# Patient Record
Sex: MALE | Race: WHITE | HISPANIC OR LATINO | Employment: FULL TIME | URBAN - METROPOLITAN AREA
[De-identification: names, ages, dates, MRNs, and addresses within clinical notes are randomized per-mention and may not be internally consistent; named-entity substitution may affect disease eponyms.]

---

## 2019-05-11 ENCOUNTER — OFFICE VISIT (OUTPATIENT)
Dept: URGENT CARE | Facility: CLINIC | Age: 24
End: 2019-05-11

## 2019-05-11 VITALS
SYSTOLIC BLOOD PRESSURE: 106 MMHG | OXYGEN SATURATION: 99 % | RESPIRATION RATE: 18 BRPM | TEMPERATURE: 99 F | HEIGHT: 67 IN | HEART RATE: 60 BPM | DIASTOLIC BLOOD PRESSURE: 61 MMHG | WEIGHT: 175 LBS | BODY MASS INDEX: 27.47 KG/M2

## 2019-05-11 DIAGNOSIS — T78.40XA ALLERGIC REACTION, INITIAL ENCOUNTER: Primary | ICD-10-CM

## 2019-05-11 PROCEDURE — 99203 OFFICE O/P NEW LOW 30 MIN: CPT | Mod: S$GLB,,, | Performed by: PHYSICIAN ASSISTANT

## 2019-05-11 PROCEDURE — 99203 PR OFFICE/OUTPT VISIT, NEW, LEVL III, 30-44 MIN: ICD-10-PCS | Mod: S$GLB,,, | Performed by: PHYSICIAN ASSISTANT

## 2019-05-11 RX ORDER — METHYLPREDNISOLONE 4 MG/1
TABLET ORAL
Qty: 1 PACKAGE | Refills: 0 | Status: SHIPPED | OUTPATIENT
Start: 2019-05-11 | End: 2019-06-01

## 2019-05-11 NOTE — PATIENT INSTRUCTIONS
PLEASE READ YOUR DISCHARGE INSTRUCTIONS ENTIRELY AS IT CONTAINS IMPORTANT INFORMATION.  Benadryl as needed for itching.   - Rest.    - Drink plenty of fluids.    - Tylenol or Ibuprofen as directed as needed for fever/pain.    - Follow up with your PCP or specialty clinic as directed in the next 1-2 weeks if not improved or as needed.  You can call [phone] to schedule an appointment with the appropriate provider.    - If you were prescribed antibiotics, please take them to completion.  - If you were prescribed a narcotic medication, do not drive or operate heavy equipment or machinery while taking these medications.  - If you  smoke, please stop smoking.  -You must understand that you've received an Urgent Care treatment only and that you may be released before all your medical problems are known or treated. You, the patient, will    arrange for follow up care as instructed.  - Please return to Urgent Care or to the Emergency Department if your symptoms worsen.    Patient aware and verbalized understanding.    Reacción alérgica [Allergic Reaction, Other (Local)]  Usted está teniendo ezra reacción alérgica. Jo cualquier cosa puede producirla. Distintas personas son alérgicas a distintas cosas. Por lo general es algo que comió o tragó, o con lo que entró en contacto al ponerlo o prenderlo a dias piel o a dias ropa, o incluso algo que inhaló en el aire. [place] puede ser muy molesto y a veces asustador.  Síntomas  La mayoría de nosotros pensamos en ezra reacción alérgica cuando tenemos un salpullido o picazón en la piel. Los síntomas pueden incluir:  · Salpullido, ronchas, enrojecimiento, granos, ampollas  · Picazón, ardor, punzadas, dolor  · Piel seca, escamosa o agrietada  · Hinchazón de la thania, los labios u otras partes del cuerpo  Causas  Algunas veces la causa puede ser totalmente obvia. Sin embargo, existen tantas cosas que pueden provocar ezra reacción que muchas veces no se puede determinar la causa. Lo más  importante para tener en cuenta es:  · Recordar cuándo empezó  · Lo que estaba haciendo en thea momento o sue antes  · Las actividades en las que estaba involucrado  · Cualquier producto o contacto nuevo  Estas son algunas de las causas más comunes, ramy recuerde que khanh cualquier cosa puede provocar ezra reacción, y usted puede ni darse cuenta de que entró en contacto con ezra de estas cosas.  · Polvo, moho, polen  · Plantas, hierbas, zumaque o hiedra venenosa (son comunes, ramy hay otras)  · Animales  · Alimentos kamran langostinos, mariscos, cacahuate (maní), productos lácteos, gluten, huevos, colorantes y aditivos de sabor  · Picaduras de insectos kamran mosquitos, pulgas o piojos  · Medicamentos kamran penicilina, sulfas, amoxicilina, aspirina, ibuprofeno. Cualquier medicamento puede provocar ezra reacción  · Joyas o alhajas (níquel, mi) pueden ser nuevas o haberlas ya usado por algún tiempo. También estos metales en cremalleras y botones.  · Látex, puede ser en guantes, prendas de vestir, juguetes, globos o algunas cintas. Algunas personas alérgicas al látex pueden tener problemas con alimentos kamran plátanos (bananas), aguacates, kiwis, papayas o castañas.  Cuidados en el hogar  [place] puede darse cuenta, desafortunadamente hay muchas causas para reacciones alérgicas y podemos no ser conscientes de la causa. No existe ezra prueba que podamos hacernos, y la mejor manera en que se pueden detectar es observando detenidamente lo que pasó y monitoreando los efectos que estas cosas puedan tener en el futuro.  El objetivo de nuestro tratamiento es ayudarle a aliviar los síntomas para que se sienta mejor. El salpullido suele ir desapareciendo en el transcurso de varios días, ramy algunas veces puede durar hasta dos semanas. [place] los próximos dos días, puede curtis momentos en que empeora un poco y luego vuelve a mejorar. Estas son algunas cosas que puede hacer:  · Si ya sabe a lo que es alérgico, evítelo ya que las  reacciones futuras pueden ser peor que esta.  · Evite la ropa apretada y cualquier cosa que caliente dias piel (las duchas o agustina calientes, los alice de sol directos) puesto que el calor puede empeorar la picazón.  · Ezra bolsa de hielo puede aliviar las zonas de picazón intensa y el enrojecimiento. No ponga el hielo directamente sobre la piel porque puede causarle daño. Póngalo en ezra bolsa de plástico o envuélvalo en ezra toalla, ezra camiseta o un paño limpio.  · El Benadryl (difenhiframina) oral es un antiestamínico que se consigue en farmacias y supermercados. A no ser que le hayan dado ezra receta para un antiestamínico, el Benadryl puede usarse para reducir la picazón de amplias zonas de la piel que estén involucradas. Puede ponerlo somnoliento, de manera que tenga cuidado cuando lo use markie el día o al ir a la escuela, trabajar o manejar. [NOTA: no use Benadryl si tiene glaucoma o si es un hombre con dificultad para orinar debido a ezra próstata agrandada.] Hay antiestamínicos que producen menos somnolencia y que son buenas alternativas markie el día. [place] sugerencias a dias farmacéutico.  · No use Benadryl en crema en dias piel, ya que en algunas personas puede aumentar la reacción y volverlo alérgico al Benadryl.  · Trate de no rascarse. [place] puede agrietar la piel y provocar ezra infección.  Si sabe qué fue lo que disparó la reacción alérgica trate de evitar o limitar dias exposición a esto. Utilizar vapor caliente para limpiar dias casa, utilizar aspiradoras y filtros (HEPA) de ifeoma eficiencia contra partículas, evitar los desencadenantes en alimentos y animales, exterminar las cucarachas y limpiar la casa con frecuencia son algunas de las estrategias que puede usar para disminuir garrick reacciones alérgicas.  Cuidados de seguimiento  Iris un seguimiento con dias proveedor de atención médica en los próximos dos días si garrick síntomas no continúan mejorando o si empeoran.  Cuándo buscar consejo médico  Llame de  inmediato a dias proveedor de atención médica si algo de lo siguiente ocurre:  · Aumentan las zonas en las que hay picazón, enrojecimiento o hinchazón  · Nueva o peor hinchazón en la thania, los párpados o los labios  · Mareos, debilidad  · Señales de infección  ¨ Aumenta el enrojecimiento  ¨ Aumenta el dolor o la hinchazón  ¨ Fiebre (1 nay por encima de la temperatura normal) que dura de 24 a 48 horas, o lo que le haya dicho el proveedor de atención médica de acuerdo con dias afección  ¨ Líquido de color que supura de las zonas inflamadas  Llame al 911  Llame al 911 si algo de lo siguiente ocurre:  · Dificultad para respirar o tragar, silbidos al respirar  · Nueva hinchazón o que empeora en la boca, la garganta y la lengua  · Voz ronca o dificultad para hablar  · Confusión  · Mucha somnolencia o dificultad para despertarse  · Desmayo o pérdida del conocimiento  · Latidos del corazón acelerados  · Presión arterial baja  · Sensación de catástrofe inminente  · Náuseas, vómito, dolor abdominal, diarrea  · Vomita dilcia, o grandes cantidades de dilcia en las heces  · Convulsiones  Date Last Reviewed: 7/30/2015 © 2000-2017 [employer]. [address] [place] [address], [place] [address]. Todos los derechos reservados. Esta información no pretende sustituir la atención médica profesional. Sólo dias médico puede diagnosticar y tratar un problema de sukumar.

## 2019-05-11 NOTE — PROGRESS NOTES
"Subjective:       Patient ID: Theron Conner is a 23 y.o. male.    Vitals:  height is 5' 7" (1.702 m) and weight is 79.4 kg (175 lb). His temperature is 98.6 °F (37 °C). His blood pressure is 106/61 and his pulse is 60. His respiration is 18 and oxygen saturation is 99%.     Chief Complaint: Rash    Mr. Conner presents today for evaluation of rash.  He has had the rash for the past 5 days.  He has raised wheals on multiple areas of the body including upper back, abdomen, thighs bilaterally, forearms & penis.  The rash is itchy.  He has tried hydrocortisone cream with no relief. He denies any contacts with a similar rash.  No penile discharge or STD exposure.  No exposure to new foods, detergents.    Rash   This is a new problem. Episode onset: 1 week. The problem has been gradually worsening since onset. The affected locations include the right upper leg, left upper leg, back and abdomen (on penis area bump). The rash is characterized by itchiness. He was exposed to nothing. Pertinent negatives include no cough, fever or sore throat. Past treatments include anti-itch cream. The treatment provided no relief. There is no history of eczema.       Constitution: Negative for chills and fever.   HENT: Negative for facial swelling and sore throat.    Neck: Negative for painful lymph nodes.   Eyes: Negative for eye itching and eyelid swelling.   Respiratory: Negative for cough.    Musculoskeletal: Negative for joint pain and joint swelling.   Skin: Positive for rash and hives. Negative for color change, pale, wound, abrasion, laceration, lesion, skin thickening/induration, puncture wound, erythema, abscess and avulsion.   Allergic/Immunologic: Positive for hives. Negative for environmental allergies and immunocompromised state.   Hematologic/Lymphatic: Negative for swollen lymph nodes.       Objective:      Physical Exam   Constitutional: He is oriented to person, place, and time. He appears well-developed and " well-nourished. He is cooperative.  Non-toxic appearance. He does not appear ill. No distress.   HENT:   Head: Normocephalic and atraumatic.   Right Ear: Hearing, tympanic membrane, external ear and ear canal normal.   Left Ear: Hearing, tympanic membrane, external ear and ear canal normal.   Nose: Nose normal. No mucosal edema, rhinorrhea or nasal deformity. No epistaxis. Right sinus exhibits no maxillary sinus tenderness and no frontal sinus tenderness. Left sinus exhibits no maxillary sinus tenderness and no frontal sinus tenderness.   Mouth/Throat: Uvula is midline, oropharynx is clear and moist and mucous membranes are normal. No trismus in the jaw. Normal dentition. No uvula swelling. No posterior oropharyngeal erythema.   Eyes: Conjunctivae and lids are normal. Right eye exhibits no discharge. Left eye exhibits no discharge. No scleral icterus.   Sclera clear bilat   Neck: Trachea normal, normal range of motion, full passive range of motion without pain and phonation normal. Neck supple.   Cardiovascular: Normal rate, regular rhythm, normal heart sounds, intact distal pulses and normal pulses.   Pulmonary/Chest: Effort normal and breath sounds normal. No respiratory distress.   Abdominal: Soft. Normal appearance and bowel sounds are normal. He exhibits no distension, no pulsatile midline mass and no mass. There is no tenderness.   Genitourinary:         Musculoskeletal: Normal range of motion. He exhibits no edema or deformity.   Neurological: He is alert and oriented to person, place, and time. He exhibits normal muscle tone. Coordination normal.   Skin: Skin is warm, dry and intact. Rash noted. Rash is nodular and urticarial. Rash is not macular, not pustular and not vesicular. He is not diaphoretic. No erythema. No pallor.        Psychiatric: He has a normal mood and affect. His speech is normal and behavior is normal. Judgment and thought content normal. Cognition and memory are normal.   Nursing note and  vitals reviewed.      Assessment:       1. Allergic reaction, initial encounter        Plan:         Allergic reaction, initial encounter    Other orders  -     methylPREDNISolone (MEDROL DOSEPACK) 4 mg tablet; use as directed  Dispense: 1 Package; Refill: 0      Patient Instructions   PLEASE READ YOUR DISCHARGE INSTRUCTIONS ENTIRELY AS IT CONTAINS IMPORTANT INFORMATION.  Benadryl as needed for itching.   - Rest.    - Drink plenty of fluids.    - Tylenol or Ibuprofen as directed as needed for fever/pain.    - Follow up with your PCP or specialty clinic as directed in the next 1-2 weeks if not improved or as needed.  You can call [phone] to schedule an appointment with the appropriate provider.    - If you were prescribed antibiotics, please take them to completion.  - If you were prescribed a narcotic medication, do not drive or operate heavy equipment or machinery while taking these medications.  - If you  smoke, please stop smoking.  -You must understand that you've received an Urgent Care treatment only and that you may be released before all your medical problems are known or treated. You, the patient, will    arrange for follow up care as instructed.  - Please return to Urgent Care or to the Emergency Department if your symptoms worsen.    Patient aware and verbalized understanding.    Reacción alérgica [Allergic Reaction, Other (Local)]  Usted está teniendo ezra reacción alérgica. [place] cualquier cosa puede producirla. Distintas personas son alérgicas a distintas cosas. Por lo general es algo que comió o tragó, o con lo que entró en contacto al ponerlo o prenderlo a dias piel o a dias ropa, o incluso algo que inhaló en el aire. [place] puede ser muy molesto y a veces asustador.  Síntomas  La mayoría de nosotros pensamos en ezra reacción alérgica cuando tenemos un salpullido o picazón en la piel. Los síntomas pueden incluir:  · Salpullido, ronchas, enrojecimiento, granos, ampollas  · Picazón, ardor, punzadas,  dolor  · Piel seca, escamosa o agrietada  · Hinchazón de la thania, los labios u otras partes del cuerpo  Causas  Algunas veces la causa puede ser totalmente obvia. Sin embargo, existen tantas cosas que pueden provocar ezra reacción que muchas veces no se puede determinar la causa. Lo más importante para tener en cuenta es:  · Recordar cuándo empezó  · Lo que estaba haciendo en thea momento o sue antes  · Las actividades en las que estaba involucrado  · Cualquier producto o contacto nuevo  Estas son algunas de las causas más comunes, ramy recuerde que khanh cualquier cosa puede provocar ezra reacción, y usted puede ni darse cuenta de que entró en contacto con ezra de estas cosas.  · Polvo, moho, polen  · Plantas, hierbas, zumaque o hiedra venenosa (son comunes, ramy hay otras)  · Animales  · Alimentos kamran langostinos, mariscos, cacahuate (maní), productos lácteos, gluten, huevos, colorantes y aditivos de sabor  · Picaduras de insectos kamran mosquitos, pulgas o piojos  · Medicamentos kamran penicilina, sulfas, amoxicilina, aspirina, ibuprofeno. Cualquier medicamento puede provocar ezra reacción  · Joyas o alhajas (níquel, mi) pueden ser nuevas o haberlas ya usado por algún tiempo. También estos metales en cremalleras y botones.  · Látex, puede ser en guantes, prendas de vestir, juguetes, globos o algunas cintas. Algunas personas alérgicas al látex pueden tener problemas con alimentos kamran plátanos (bananas), aguacates, kiwis, papayas o castañas.  Cuidados en el hogar  [place] puede darse cuenta, desafortunadamente hay muchas causas para reacciones alérgicas y podemos no ser conscientes de la causa. No existe ezra prueba que podamos hacernos, y la mejor manera en que se pueden detectar es observando detenidamente lo que pasó y monitoreando los efectos que estas cosas puedan tener en el futuro.  El objetivo de nuestro tratamiento es ayudarle a aliviar los síntomas para que se sienta mejor. El salpullido suele ir desapareciendo en  el transcurso de varios días, ramy algunas veces puede durar hasta dos semanas. [place] los próximos dos días, puede curtis momentos en que empeora un poco y luego vuelve a mejorar. Estas son algunas cosas que puede hacer:  · Si ya sabe a lo que es alérgico, evítelo ya que las reacciones futuras pueden ser peor que esta.  · Evite la ropa apretada y cualquier cosa que caliente dias piel (las duchas o agustina calientes, los alice de sol directos) puesto que el calor puede empeorar la picazón.  · Ezra bolsa de hielo puede aliviar las zonas de picazón intensa y el enrojecimiento. No ponga el hielo directamente sobre la piel porque puede causarle daño. Póngalo en ezra bolsa de plástico o envuélvalo en ezra toalla, ezra camiseta o un paño limpio.  · El Benadryl (difenhiframina) oral es un antiestamínico que se consigue en farmacias y supermercados. A no ser que le hayan dado ezra receta para un antiestamínico, el Benadryl puede usarse para reducir la picazón de amplias zonas de la piel que estén involucradas. Puede ponerlo somnoliento, de manera que tenga cuidado cuando lo use markie el día o al ir a la escuela, trabajar o manejar. [NOTA: no use Benadryl si tiene glaucoma o si es un hombre con dificultad para orinar debido a ezra próstata agrandada.] Hay antiestamínicos que producen menos somnolencia y que son buenas alternativas markie el día. [place] sugerencias a dias farmacéutico.  · No use Benadryl en crema en dias piel, ya que en algunas personas puede aumentar la reacción y volverlo alérgico al Benadryl.  · Trate de no rascarse. [place] puede agrietar la piel y provocar ezra infección.  Si sabe qué fue lo que disparó la reacción alérgica trate de evitar o limitar dias exposición a esto. Utilizar vapor caliente para limpiar dias casa, utilizar aspiradoras y filtros (HEPA) de ifeoma eficiencia contra partículas, evitar los desencadenantes en alimentos y animales, exterminar las cucarachas y limpiar la casa con frecuencia son algunas de las  estrategias que puede usar para disminuir garrick reacciones alérgicas.  Cuidados de seguimiento  [place] un seguimiento con dias proveedor de atención médica en los próximos dos días si garrick síntomas no continúan mejorando o si empeoran.  Cuándo buscar consejo médico  Llame de inmediato a dias proveedor de atención médica si algo de lo siguiente ocurre:  · Aumentan las zonas en las que hay picazón, enrojecimiento o hinchazón  · Nueva o peor hinchazón en la thania, los párpados o los labios  · Mareos, debilidad  · Señales de infección  ¨ Aumenta el enrojecimiento  ¨ Aumenta el dolor o la hinchazón  ¨ Fiebre (1 nay por encima de la temperatura normal) que dura de 24 a 48 horas, o lo que le haya dicho el proveedor de atención médica de acuerdo con dias afección  ¨ Líquido de color que supura de las zonas inflamadas  Llame al 911  Llame al 911 si algo de lo siguiente ocurre:  · Dificultad para respirar o tragar, silbidos al respirar  · Nueva hinchazón o que empeora en la boca, la garganta y la lengua  · Voz ronca o dificultad para hablar  · Confusión  · Mucha somnolencia o dificultad para despertarse  · Desmayo o pérdida del conocimiento  · Latidos del corazón acelerados  · Presión arterial baja  · Sensación de catástrofe inminente  · Náuseas, vómito, dolor abdominal, diarrea  · Vomita dilcia, o grandes cantidades de dilcia en las heces  · Convulsiones  Date Last Reviewed: 7/30/2015  © 8530-4495 [employer]. [address] [place] [address], [place] [address]. Todos los derechos reservados. Esta información no pretende sustituir la atención médica profesional. Sólo dias médico puede diagnosticar y tratar un problema de sukumar.

## 2019-05-11 NOTE — LETTER
May 11, 2019      [facility]  [place] [address]  Phone: [phone]  Fax: [phone]       Patient: Theron Conner   YOB: 1995  Date of Visit: 05/11/2019    To Whom It May Concern:    Theron Conner  was at [facility] on 05/11/2019. He may return to work on 5/13/19 with no restrictions. If you have any questions or concerns, or if I can be of further assistance, please do not hesitate to contact me.    Sincerely,    Winnie Arboleda PA-C

## 2019-06-09 ENCOUNTER — HOSPITAL ENCOUNTER (EMERGENCY)
Facility: HOSPITAL | Age: 24
Discharge: HOME | End: 2019-06-09
Attending: EMERGENCY MEDICINE

## 2019-06-09 VITALS
HEIGHT: 67 IN | OXYGEN SATURATION: 98 % | SYSTOLIC BLOOD PRESSURE: 127 MMHG | RESPIRATION RATE: 16 BRPM | WEIGHT: 175 LBS | DIASTOLIC BLOOD PRESSURE: 59 MMHG | TEMPERATURE: 98 F | HEART RATE: 71 BPM | BODY MASS INDEX: 27.47 KG/M2
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
## 2019-06-09 PROCEDURE — 99283 EMERGENCY DEPT VISIT LOW MDM: CPT

## 2019-06-09 RX ORDER — DIPHENHYDRAMINE HCL 50 MG
50 CAPSULE ORAL EVERY 6 HOURS PRN
Qty: 20 CAPSULE | Refills: 0 | Status: SHIPPED | OUTPATIENT
Start: 2019-06-09

## 2019-06-09 RX ORDER — DIPHENHYDRAMINE HCL 50 MG
50 CAPSULE ORAL EVERY 6 HOURS PRN
Qty: 20 CAPSULE | Refills: 0 | COMMUNITY
Start: 2019-06-09 | End: 2019-06-09 | Stop reason: SDUPTHER

## 2019-06-09 NOTE — ED NOTES
Pt complaining of bug bites to gentle area for 1 month after moving a mattress from another room into his. Bites are red, raised and itchy. Pt was prescribed steroids and it has not worked

## 2019-06-09 NOTE — DISCHARGE INSTRUCTIONS
Use benadryl cream as needed for itching  Use hydrocortisone or any other steroid cream as needed for itching

## 2019-06-09 NOTE — ED PROVIDER NOTES
Encounter Date: 6/9/2019    SCRIBE #1 NOTE: I, Mary Rodriguez, am scribing for, and in the presence of,  Dr. Briceno. I have scribed the entire note.       History     Chief Complaint   Patient presents with    Male  Problem     c/o bumps to groin and between fingers x1 month. Went to [facility] about 1 month ago and was told they were hives, but denies improvement with medications     Time seen by provider: 3:34 PM    This is a 23 y.o. male who presents with complaint of bumps on his genitals, that first appeared over a month ago, and have been getting worse. He went to Urgent Care about a month ago, where the bumps were diagnosed as hives. The Urgent Care gave him a medrol dose pack which did not alleviate the symptoms. He has tried cortisone cream, which has not helped. The medicine has not provided any relief. Last night he felt severe itching and burning that he describes as the worst. He got a new mattress over a month ago and the symptoms started soon after (the mattress was not new, it was a used mattress). The patient has a history of smoking tobacco and has kidney stone surgery in the past. He is a native [place] speaker and speaks a little English; his  translated for him.        The history is provided by the patient and a friend. The history is limited by a language barrier.     Review of patient's allergies indicates:  No Known Allergies  History reviewed. No pertinent past medical history.  Past Surgical History:   Procedure Laterality Date    KIDNEY STONE SURGERY       Family History   Problem Relation Age of Onset    No Known Problems Mother     No Known Problems Father      Social History     Tobacco Use    Smoking status: Current Every Day Smoker     Types: Cigarettes   Substance Use Topics    Alcohol use: Yes     Frequency: Never     Comment: occ    Drug use: Not on file     Review of Systems   Constitutional: Negative for fever.   HENT: Negative for sore throat.     Respiratory: Negative for shortness of breath.    Cardiovascular: Negative for chest pain.   Gastrointestinal: Negative for nausea.   Genitourinary: Negative for dysuria.   Musculoskeletal: Negative for back pain.   Skin: Negative for rash.        Bumps on genitals   Neurological: Negative for weakness.   Hematological: Does not bruise/bleed easily.       Physical Exam     Initial Vitals [06/09/19 1514]   BP Pulse Resp Temp SpO2   (!) 127/59 71 16 98.3 °F (36.8 °C) 98 %      MAP       --         Physical Exam    Nursing note and vitals reviewed.  Constitutional: He appears well-developed and well-nourished. He is not diaphoretic. No distress.   HENT:   Head: Normocephalic and atraumatic.   Nose: Nose normal.   Mouth/Throat: Oropharynx is clear and moist.   Eyes: Conjunctivae and EOM are normal. Pupils are equal, round, and reactive to light. No scleral icterus.   Neck: Normal range of motion. Neck supple. No JVD present.   Cardiovascular: Normal rate, regular rhythm and normal heart sounds. Exam reveals no gallop and no friction rub.    No murmur heard.  Pulmonary/Chest: Breath sounds normal. No stridor. No respiratory distress. He has no wheezes. He exhibits no tenderness.   Abdominal: Soft. Bowel sounds are normal. He exhibits no distension and no mass. There is no tenderness. There is no rebound and no guarding.   Genitourinary:   Genitourinary Comments: Bumps on genitals that appear to be insect bites   Musculoskeletal: Normal range of motion. He exhibits no edema or tenderness.        Cervical back: Normal.        Thoracic back: Normal.        Lumbar back: Normal.   Lymphadenopathy:     He has no cervical adenopathy.   Neurological: He is alert and oriented to person, place, and time. He has normal strength. No cranial nerve deficit.   Skin: Skin is warm and dry. No rash noted. No pallor.   Psychiatric: He has a normal mood and affect. Thought content normal.         ED Course   Procedures  Labs Reviewed - No  data to display                              ED Course as of Jun 09 1637   Sun Jun 09, 2019   1635 The patient has multiple lesions to his scrotum, lower abdomen, hands and a few on his arms. The lesions are most consistent with insect bites and I believe the patient has bedbugs. He will need to treat his house and mattress.    [ST]      ED Course User Index  [ST] Floresita Briceno MD     Clinical Impression:       ICD-10-CM ICD-9-CM   1. Insect bite, subsequent encounter W57.XXXD V58.89     919.4               I, Floresita Briceno, personally performed the services described in this documentation. All medical record entries made by the scribe were at my direction and in my presence.  I have reviewed the chart and agree that the record reflects my personal performance and is accurate and complete. Floresita Briceno M.D. 5:29 PM06/09/2019                 Floresita Briceno MD  06/09/19 1637       Floresita Briceno MD  06/09/19 1726